# Patient Record
Sex: MALE | Race: WHITE | URBAN - METROPOLITAN AREA
[De-identification: names, ages, dates, MRNs, and addresses within clinical notes are randomized per-mention and may not be internally consistent; named-entity substitution may affect disease eponyms.]

---

## 2020-11-10 ENCOUNTER — HISTORICAL (OUTPATIENT)
Dept: ADMINISTRATIVE | Facility: HOSPITAL | Age: 70
End: 2020-11-10

## 2022-04-11 ENCOUNTER — HISTORICAL (OUTPATIENT)
Dept: ADMINISTRATIVE | Facility: HOSPITAL | Age: 72
End: 2022-04-11

## 2022-04-27 VITALS
SYSTOLIC BLOOD PRESSURE: 112 MMHG | DIASTOLIC BLOOD PRESSURE: 74 MMHG | HEIGHT: 72 IN | WEIGHT: 213.38 LBS | BODY MASS INDEX: 28.9 KG/M2

## 2022-05-05 NOTE — HISTORICAL [facility] CERNER
This is a historical note converted from Francoise. Formatting and pictures may have been removed.  Please reference Francoise for original formatting and attached multimedia. Chief Complaint  Pt here for b/l knee pain ready to discuss surgery...cv  History of Present Illness  bilateral pain in the knees  affecting his ADLs  worse with activity  Review of Systems  Review of Systems?  ?????Constitutional: ?No fever, No chills. ?  ?????Respiratory: ?No shortness of breath, No cough. ?  ?????Cardiovascular: ?No chest pain. ?  ?????Gastrointestinal: ?No nausea, No vomiting, No diarrhea, No constipation, No heartburn. ?  ?????Genitourinary: ?No dysuria, No hematuria. ?  ?????Hematology/Lymphatics: ?No bleeding tendency. ?  ?????Endocrine: ?No polyuria. ?  ?????Neurologic: ?Alert and oriented X4, No numbness, No tingling. ?  ?????Psychiatric: ?No anxiety, No depression. ?  ?????Integumentary: no abnormalities except as noted in history of present illness  Physical Exam  Vitals & Measurements  T:?36.2? ?C (Oral)? HR:?95(Peripheral)? BP:?112/74?  HT:?184.00?cm? WT:?96.800?kg? BMI:?28.59?  PHYSICAL FINDINGS  Cardiovascular:  ? ?? Arterial Pulses: ? Dorsalis pedis pulses were normal right.? ? Dorsalis pedis pulses were normal left.  Musculoskeletal System:  ? ?? Hips:  ? ?? ?? ? General/bilateral: ? No swelling of the hips.? ? No induration of the hips.  ? ?? ?? ? Right Hip: ? Motion was normal.? ? No pain was elicited by active internal rotation with the hip flexed.  ? ?? ?? ? ? No pain was elicited by active external rotation with the hip flexed.? ? No pain was elicited by active motion.? ? No pain was elicited by passive motion.  ? ?? ?? ? Left Hip: ? Motion was normal.  ? ?? Left and right ?Knee: ? Examined.? ??No effusion.? ? no localized swelling.? ?Genu varum.? ?Patella demonstrated crepitus.? ?Anteromedial aspect was tender on palpation.? ?Medial aspect was tender on palpation.  ?   Left Knee:  Knee Motion:? ?? ??  Value???????????  ? Active flexion????????? 120?degrees  Active extension???????3?degrees  ?   left and right?knee ?? Pain was elicited throughout the range of motion.? ? Swelling with a negative fluctuation test.? ? No erythema.? ? No warmth.? ? Anterior aspect was not tender on palpation.? ? Patellofemoral region was tender on palpation.? ? Medial collateral ligament was not tender on palpation.? ? Lateral collateral ligament was not tender on palpation.? ? No pain was elicited by motion using Tah apprehension test.? ? No laxity of the posterior cruciate ligament.? ? No anterior drawer sign was present.? ? No one plane medial (straight) instability.? ? No one plane lateral (straight) instability.? ? A Lachman test did not demonstrate one plane anterior instability.? ? Clarkes sign was observed for chondromalacia.  ?   Right Knee:  Knee Motion:? ?? ? Value? ?? ?? ?? ?? Normal Range  Active flexion???????? 120?degrees  Active extension????? 3?degrees  ?  ?  ?   TESTS  Imaging:  X-Ray Knee:  AP and lateral view x-rays of the left and right?knee with sunrise view of the patella were performed  ?   IMPRESSIONS RADIOLOGY TEST  Narrowing of the left and right ?knee joint space (bone on bone PFJ primarily with near bone on bone medially), showed sclerosis of the left knee, and osteophytes arising from the left knee.  ?  ?  diagnosis  bilateral knee OA  ?  plan  NSAIDS  low impact activity avoiding deep knee flexion  ?  can inject cortisone if pain increases  Assessment/Plan  1.?Arthritis of right knee?M17.11  2.?Arthritis of left knee?M17.12  Orders:  XR Knee Left 4 or More Views, Routine, 11/10/20 14:59:00 CST, None, Ambulatory, Rad Type, Bilateral knee pain, Not Scheduled, 11/10/20 14:59:00 CST  XR Knee Right 4 or More Views, Routine, 11/10/20 14:59:00 CST, None, Ambulatory, Rad Type, Bilateral knee pain, Not Scheduled, 11/10/20 14:59:00 CST   Problem List/Past Medical History  Ongoing  Arthritis of left  knee  Arthritis of right knee  High cholesterol  Hypertension  Historical  No qualifying data  Procedure/Surgical History  Hernia repair  Left knee arthroscopy   Medications  glucosamine 500 mg oral capsule, 500 mg= 1 cap(s), Oral, Daily  hydrochlorothiazide-olmesartan 12.5 mg-20 mg oral tablet, 1 tab(s), Oral, Daily  lovastatin 40 mg oral tablet, 40 mg= 1 tab(s), Oral, Daily  Vitamin D 50,000 intl units oral capsule, 76639 IntUnit= 1 cap(s), Oral, 2x/Wk  Allergies  No Known Medication Allergies  Social History  Abuse/Neglect  No, 11/10/2020  Tobacco  Never (less than 100 in lifetime), N/A, 11/10/2020  Family History  Cancer: Father.  Health Maintenance  Health Maintenance  ???Pending?(in the next year)  ??? ??OverDue  ??? ? ? ?Advance Directive due??01/02/20??and every 1??year(s)  ??? ? ? ?Cognitive Screening due??01/02/20??and every 1??year(s)  ??? ? ? ?Functional Assessment due??01/02/20??and every 1??year(s)  ??? ??Due?  ??? ? ? ?ADL Screening due??11/10/20??and every 1??year(s)  ??? ? ? ?Aspirin Therapy for CVD Prevention due??11/10/20??and every 1??year(s)  ??? ? ? ?Hypertension Management-Education due??11/10/20??and every 1??year(s)  ??? ? ? ?Hypertension Maintenance-Medication Prescribed due??11/10/20??and every 1??year(s)  ??? ? ? ?Medicare Annual Wellness Exam due??11/10/20??and every 1??year(s)  ??? ? ? ?Tetanus Vaccine due??11/10/20??and every 10??year(s)  ??? ??Due In Future?  ??? ? ? ?Obesity Screening not due until??01/01/21??and every 1??year(s)  ??? ? ? ?Alcohol Misuse Screening not due until??01/02/21??and every 1??year(s)  ??? ? ? ?Fall Risk Assessment not due until??01/02/21??and every 1??year(s)  ???Satisfied?(in the past 1 year)  ??? ??Satisfied?  ??? ? ? ?Alcohol Misuse Screening on??11/10/20.??Satisfied by Pricila West LPN  ??? ? ? ?Blood Pressure Screening on??11/10/20.??Satisfied by Pricila West LPN  ??? ? ? ?Body Mass Index Check on??11/10/20.??Satisfied by Brett  Pricila BOO  ??? ? ? ?Fall Risk Assessment on??11/10/20.??Satisfied by Pricila West LPN  ??? ? ? ?Hypertension Management-Blood Pressure on??11/10/20.??Satisfied by Pricila West LPN  ??? ? ? ?Obesity Screening on??11/10/20.??Satisfied by Pricila West LPN  ?